# Patient Record
Sex: FEMALE | Race: WHITE
[De-identification: names, ages, dates, MRNs, and addresses within clinical notes are randomized per-mention and may not be internally consistent; named-entity substitution may affect disease eponyms.]

---

## 2018-07-05 ENCOUNTER — HOSPITAL ENCOUNTER (EMERGENCY)
Dept: HOSPITAL 42 - ED | Age: 63
Discharge: HOME | End: 2018-07-05
Payer: MEDICAID

## 2018-07-05 VITALS
HEART RATE: 89 BPM | SYSTOLIC BLOOD PRESSURE: 150 MMHG | DIASTOLIC BLOOD PRESSURE: 82 MMHG | OXYGEN SATURATION: 100 % | RESPIRATION RATE: 17 BRPM

## 2018-07-05 VITALS — TEMPERATURE: 98.4 F

## 2018-07-05 VITALS — BODY MASS INDEX: 27.1 KG/M2

## 2018-07-05 DIAGNOSIS — M25.562: Primary | ICD-10-CM

## 2018-07-05 DIAGNOSIS — I10: ICD-10-CM

## 2018-07-05 PROCEDURE — 96372 THER/PROPH/DIAG INJ SC/IM: CPT

## 2018-07-05 PROCEDURE — 99283 EMERGENCY DEPT VISIT LOW MDM: CPT

## 2018-07-05 PROCEDURE — 73562 X-RAY EXAM OF KNEE 3: CPT

## 2018-07-05 NOTE — ED PDOC
Arrival/HPI





- General


Chief Complaint: Lower Extremity Problem/Injury


Time Seen by Provider: 18 22:50


Historian: Patient





- History of Present Illness


Narrative History of Present Illness (Text): 





18 23:36


63-year-old female with a history of arthritis in the left knee presents today 

with a 3 day history of left knee pain and some swelling. Patient states she 

took some Naprosyn today for pain without improvement. Patient denies any 

recent trauma or injury. She denies any recent travel. She denies calf pain. 

She denies numbness weakness or tingling in the extremity. Patient is 

complaining of worsening pain with ambulation and flexion. Patient states she 

has been applying ice. Patient denies fevers or chills. No other complaints.


Time/Duration: Other (3 days)





Past Medical History





- Provider Review


Nursing Documentation Reviewed: Yes





- Travel History


Have you recently traveled outside [place] w/in the past 3 mons?: No





- Tetanus Immunization


Tetanus Immunization: Unknown





- Cardiac


Hx Cardiac Disorders: Yes


Hx Hypertension: Yes





- Pulmonary


Hx Respiratory Disorders: No





- Musculoskeletal/Rheumatological


Hx Musculoskeletal Disorders: Yes


Hx Arthritis: Yes


Other/Comment: sciatica





- Psychiatric


Hx Substance Use: No





- Surgical History


Hx  Section: Yes


Hx Orthopedic Surgery: Yes (R TKR)


Other/Comment: R ovary removal





Family/Social History





- Physician Review


Nursing Documentation Reviewed: Yes


Family/Social History: Unknown Family HX


Smoking Status: Never Smoked


Hx Alcohol Use: No


Hx Substance Use: No





Allergies/Home Meds


Allergies/Adverse Reactions: 


Allergies





No Known Allergies Allergy (Verified 18 22:39)


 








Home Medications: 


 Home Meds











 Medication  Instructions  Recorded  Confirmed


 


Naproxen [Naprosyn] 500 mg PO PRN PRN 18














Review of Systems





- Review of Systems


Constitutional: absent: Fatigue, Fevers


Respiratory: absent: SOB, Cough


Cardiovascular: absent: Chest Pain, Palpitations


Gastrointestinal: absent: Abdominal Pain, Nausea, Vomiting


Genitourinary Female: absent: Dysuria, Frequency, Hematuria


Musculoskeletal: Arthralgias (left knee pain).  absent: Back Pain, Neck Pain


Skin: absent: Rash, Pruritis


Neurological: absent: Headache, Dizziness


Psychiatric: absent: Anxiety, Depression





Physical Exam


Vital Signs Reviewed: Yes





Vital Signs











  Temp Pulse Resp BP Pulse Ox


 


 18 22:33  98.4 F  54 L  18  168/69 H  98











Temperature: Afebrile


Blood Pressure: Hypertensive


Pulse: Regular


Respiratory Rate: Normal


Appearance: Positive for: Well-Appearing, Non-Toxic, Comfortable


Pain Distress: None


Mental Status: Positive for: Alert and Oriented X 3





- Systems Exam


Head: Present: Atraumatic


Mouth: Present: Moist Mucous Membranes


Neck: Present: Normal Range of Motion


Respiratory/Chest: Present: Clear to Auscultation, Good Air Exchange.  No: 

Respiratory Distress, Accessory Muscle Use


Cardiovascular: Present: Regular Rate and Rhythm, Normal S1, S2.  No: Murmurs


Abdomen: No: Tenderness, Distention, Peritoneal Signs, Rebound, Guarding


Upper Extremity: Present: Normal ROM


Lower Extremity: Present: NORMAL PULSES, Normal ROM, Tenderness (left knee; + 

ttp over theanterior aspect of the knee; + minimal edema without obvious area 

of fluid collection. no erythema; no warmth; full rom of knee. no calf 

tenderness. sensation and distal pulses intact. ), Swelling, Neurovascularly 

Intact.  No: CALF TENDERNESS, Erythema, Deformity


Neurological: Present: GCS=15, Speech Normal


Skin: Present: Warm, Dry, Normal Color.  No: Rashes


Psychiatric: Present: Alert, Oriented x 3





Medical Decision Making


ED Course and Treatment: 





18 00:27


Patient nontoxic well-appearing in no distress with stable vital signs





X-rays of the knee no fracture positive arthritis 


Toradol IM





Patient placed in knee immobilizer. Crutches given for ambulation








I discussed all results with patient advised to followup with the orthopedist 

for the next 2 days. Return if symptoms worsen persist or new symptoms develop








Patient verbalizes understanding of discharge instructions and need for 

immediate followup.








all aspects of this case were discussed the attending of record. 





Impression: knee pain, arthritis


Motrin every 6 hours as needed for pain


Rest, ice, compression, elevation


Use crutches for ambulation


Followup with the orthopedist within the next 2 days 


Followup with primary care physician within the next 2 days


Return if symptoms worsen persist or if new symptoms develop


Reassessment Condition: Re-examined, Improved





- RAD Interpretation


Radiology Orders: 











18 22:50


KNEE WITH PATELLA LEFT 3 VIEW [RAD] Stat 














- Medication Orders


Current Medication Orders: 














Discontinued Medications





Ketorolac Tromethamine (Toradol)  60 mg IM STAT STA


   Stop: 18 22:51


   Last Admin: 18 23:01  Dose: 60 mg





MAR Pain Assessment


 Document     18 23:01  CNR  (Rec: 18 23:02  CNR  [id number])


     Pain Reassessment


      Is this a pain reassessment?               No


IM Administration Charges


 Document     18 23:01  CNR  (Rec: 18 23:02  CNR  [id number])


     Injection Site


      MAR Injection Site                         Left Vastus Lateralis


     Charges for Administration


      # of IM Administrations                    1














Disposition/Present on Arrival





- Present on Arrival


Any Indicators Present on Arrival: No


History of DVT/PE: No


History of Uncontrolled Diabetes: No


Urinary Catheter: No


History of Decub. Ulcer: No


History Surgical Site Infection Following: None





- Disposition


Have Diagnosis and Disposition been Completed?: Yes


Diagnosis: 


 Knee pain





Disposition: HOME/ ROUTINE


Disposition Time: 23:31


Patient Plan: Discharge


Condition: GOOD


Discharge Instructions (ExitCare):  Knee Pain (DC)


Additional Instructions: 


Motrin every 6 hours as needed for pain


tramadol; 1 tablet every 6 hours as needed for moderate to severe pain; may 

cause drowsiness. 


Rest, ice, compression, elevation


Use crutches for ambulation


Followup with the orthopedist within the next 2 days 


Followup with primary care physician within the next 2 days


Return if symptoms worsen persist or if new symptoms develop


Prescriptions: 


Ibuprofen [Motrin] 600 mg PO Q6H PRN #20 tab


 PRN Reason: pain/fever reduction


traMADol [Ultram] 50 mg PO Q6H PRN #6 tab


 PRN Reason: moderate to severe pain


Referrals: 


Ruben Quigley III, MD [Medical Doctor] - Follow up with primary


Amanda Perez MD [Staff Provider] - Follow up with primary


Forms:  [employer] (English), WORK NOTE

## 2018-07-06 NOTE — RAD
PROCEDURE:  Left Knee Radiographs.



HISTORY:

Pain.



COMPARISON:

None.



FINDINGS:



BONES:

Normal. No fracture. 



JOINTS:

Medial and patellofemoral osteoarthritis.  Lateral compartment 

appears preserved.  Patellofemoral osteoarthritis is mild but medial 

osteoarthritis is severe. No articular erosions are appreciated. 



JOINT EFFUSION:

None. 



OTHER FINDINGS:

None.



IMPRESSION:

Medial and patellofemoral osteoarthritis.